# Patient Record
(demographics unavailable — no encounter records)

---

## 2024-10-18 NOTE — PLAN
[FreeTextEntry1] : 37-year-old female presents for evaluation and medication reconciliation Left sided abdominal pain-after she ate meat the other day she felt terrible pain she thinks that she might have a food allergy A UA CNS and referral for gastroenterology is made Ultrasound of the abdomen is performed Left shoulder pain-x-rays of the left shoulder.  She has a longstanding history of neck pain.  A physical therapy referral is made

## 2025-01-29 NOTE — PLAN
[FreeTextEntry1] : 37-year-old female presents for annual wellness exam Well-developed well-nourished female Non-smoker/nondrinker Rastafari  no children Irritable bowel syndrome-bouts of diarrhea previously diagnosed IBS Dicyclomine 20 mg trialed on a twice daily basis as needed Atypical chest pain-chest x-ray is ordered She appears to have muscular discomfort in the scapular region on the right side of her back point tenderness noted reassurance with a chest x-ray Breast cancer screening-referral to GYN is made EKG reviewed normal study/normal study/normal sinus rhythm Lab work is ordered Elevated prolactin level-by history lab work to include serum prolactin

## 2025-01-31 NOTE — HISTORY OF PRESENT ILLNESS
[FreeTextEntry1] :  Subjective:   - Summary: 45-year-old female presenting for annual gynecological visit with complaints of ovarian pain after menstruation and breast pain. Patient has a history of a benign breast mass (fibroadenoma) three years ago.   - Chief Complaint (CC): Ovarian pain after menstruation and breast pain   - History of Present Illness (HPI): Patient reports experiencing ovarian pain after her menstrual period for the past two weeks. The pain is intermittent. She also mentions breast pain. Patient has a history of a benign breast mass (fibroadenoma) approximately three years ago, which resolved on its own. She has never had a mammogram.

## 2025-01-31 NOTE — DISCUSSION/SUMMARY
[FreeTextEntry1] :  Pelvic Pain:   - Assessment: Patient reports pain in ovaries after menstrual period. Pain has been present for about two weeks, comes and goes.   - Plan: Schedule pelvic ultrasound on a separate appointment to evaluate ovarian pain.   Breast Concerns:   - Assessment: Patient mentions previous history of a non-cancerous lump in the breast about three years ago. No current mammogram on record.   - Plan:     - Order mammogram     - Order breast ultrasound     - Educate patient on the importance of regular breast screenings, especially given her age and reported breast pain   Gynecological Examination:   - Assessment: Performed manual pelvic exam. Patient reported slight pain on the left side during examination.   - Plan: Monitor and correlate findings with upcoming pelvic ultrasound results   Preventive Care:   - Assessment: Annual gynecological visit. Patient is sexually active.   - Plan:     - Provide prescription for mammogram     - Schedule follow-up appointment for pelvic ultrasound     - Discuss results of ordered tests at next visit   Mackenzie Triana MD Obstetrics and Gynecology  Bexarotene Counseling:  I discussed with the patient the risks of bexarotene including but not limited to hair loss, dry lips/skin/eyes, liver abnormalities, hyperlipidemia, pancreatitis, depression/suicidal ideation, photosensitivity, drug rash/allergic reactions, hypothyroidism, anemia, leukopenia, infection, cataracts, and teratogenicity.  Patient understands that they will need regular blood tests to check lipid profile, liver function tests, white blood cell count, thyroid function tests and pregnancy test if applicable.

## 2025-01-31 NOTE — PHYSICAL EXAM
[Chaperone Present] : A chaperone was present in the examining room during all aspects of the physical examination [[id number]] : A chaperone was present during the pelvic exam. [FreeTextEntry2] : Denise [Appropriately responsive] : appropriately responsive [Alert] : alert [No Acute Distress] : no acute distress [No Lymphadenopathy] : no lymphadenopathy [Regular Rate Rhythm] : regular rate rhythm [No Murmurs] : no murmurs [Clear to Auscultation B/L] : clear to auscultation bilaterally [Soft] : soft [Non-tender] : non-tender [Non-distended] : non-distended [No HSM] : No HSM [No Lesions] : no lesions [No Mass] : no mass [Oriented x3] : oriented x3 [Examination Of The Breasts] : a normal appearance [No Masses] : no breast masses were palpable [Labia Majora] : normal [Labia Minora] : normal [Normal] : normal [Uterine Adnexae] : normal

## 2025-06-02 NOTE — PLAN
[FreeTextEntry1] : 37-year-old female presents for annual wellness exam Well-developed well-nourished female Wart-she had treated other warts with salicylic acid 26% successfully.  At this time no improvement she is referred to dermatology Elevated prolactin level- Her last level normalized it is to be repeated today Non-smoker/nondrinker Nondenominational  no children Irritable bowel syndrome-bouts of diarrhea previously diagnosed IBS Dicyclomine 20 mg trialed on a twice daily basis as needed Atypical chest pain-chest x-ray is ordered She appears to have muscular discomfort in the scapular region on the right side of her back point tenderness noted reassurance with a chest x-ray Breast cancer screening-referral to GYN is made EKG reviewed normal study/normal study/normal sinus rhythm Lab work is ordered Elevated prolactin level-by history lab work to include serum prolactin

## 2025-06-02 NOTE — HEALTH RISK ASSESSMENT
[0] : 2) Feeling down, depressed, or hopeless: Not at all (0) [PHQ-2 Negative - No further assessment needed] : PHQ-2 Negative - No further assessment needed [[id number]] : 0